# Patient Record
Sex: MALE | Race: WHITE
[De-identification: names, ages, dates, MRNs, and addresses within clinical notes are randomized per-mention and may not be internally consistent; named-entity substitution may affect disease eponyms.]

---

## 2023-09-21 ENCOUNTER — RX ONLY (OUTPATIENT)
Age: 48
Setting detail: RX ONLY
End: 2023-09-21

## 2023-09-21 ENCOUNTER — APPOINTMENT (RX ONLY)
Dept: URBAN - NONMETROPOLITAN AREA CLINIC 6 | Facility: CLINIC | Age: 48
Setting detail: DERMATOLOGY
End: 2023-09-21

## 2023-09-21 DIAGNOSIS — B35.1 TINEA UNGUIUM: ICD-10-CM

## 2023-09-21 DIAGNOSIS — B35.3 TINEA PEDIS: ICD-10-CM

## 2023-09-21 DIAGNOSIS — B35.4 TINEA CORPORIS: ICD-10-CM

## 2023-09-21 PROCEDURE — ? KOH PREP

## 2023-09-21 PROCEDURE — ? ADDITIONAL NOTES

## 2023-09-21 PROCEDURE — ? COUNSELING

## 2023-09-21 PROCEDURE — 99203 OFFICE O/P NEW LOW 30 MIN: CPT

## 2023-09-21 PROCEDURE — ? TREATMENT REGIMEN

## 2023-09-21 RX ORDER — TERBINAFINE HYDROCHLORIDE 250 MG/1
TABLET ORAL
Qty: 14 | Refills: 0 | Status: ERX | COMMUNITY
Start: 2023-09-21

## 2023-09-21 ASSESSMENT — LOCATION DETAILED DESCRIPTION DERM
LOCATION DETAILED: LEFT 2ND TOENAIL
LOCATION DETAILED: RIGHT GREAT TOENAIL
LOCATION DETAILED: LEFT 3RD TOENAIL
LOCATION DETAILED: RIGHT 5TH TOENAIL
LOCATION DETAILED: LEFT GREAT TOENAIL
LOCATION DETAILED: RIGHT BUTTOCK
LOCATION DETAILED: RIGHT 2ND TOENAIL
LOCATION DETAILED: RIGHT 4TH TOENAIL
LOCATION DETAILED: 1ST WEBSPACE LEFT FOOT
LOCATION DETAILED: RIGHT 3RD TOENAIL
LOCATION DETAILED: 1ST WEBSPACE RIGHT FOOT
LOCATION DETAILED: LEFT 4TH TOENAIL
LOCATION DETAILED: LEFT 5TH TOENAIL

## 2023-09-21 ASSESSMENT — LOCATION SIMPLE DESCRIPTION DERM
LOCATION SIMPLE: RIGHT 2ND TOE
LOCATION SIMPLE: LEFT 5TH TOE
LOCATION SIMPLE: LEFT GREAT TOE
LOCATION SIMPLE: RIGHT BUTTOCK
LOCATION SIMPLE: RIGHT 3RD TOE
LOCATION SIMPLE: LEFT 3RD TOE
LOCATION SIMPLE: LEFT 2ND TOE
LOCATION SIMPLE: RIGHT GREAT TOE
LOCATION SIMPLE: LEFT 4TH TOE
LOCATION SIMPLE: LEFT FOOT
LOCATION SIMPLE: RIGHT 5TH TOE
LOCATION SIMPLE: RIGHT FOOT
LOCATION SIMPLE: RIGHT 4TH TOE

## 2023-09-21 ASSESSMENT — LOCATION ZONE DERM
LOCATION ZONE: TOENAIL
LOCATION ZONE: FEET
LOCATION ZONE: TRUNK

## 2023-09-21 NOTE — HPI: RASH
What Type Of Note Output Would You Prefer (Optional)?: Standard Output
How Severe Is Your Rash?: moderate
Is This A New Presentation, Or A Follow-Up?: Rash
Additional History: Has been treating with topical antifungals which seemed to clear it up for a short period of time. PCP prescribed topical steroids which ended up making the rash worse.

## 2023-09-21 NOTE — PROCEDURE: TREATMENT REGIMEN
Initiate Treatment: Terbinafine 250mg QD x 2 weeks
Plan: Will review labs and discuss treatment with PCP before initiating treatment.  Spoke with PCP, Dr. Viera.  Pt's liver function was just mildly elevated, so he approved oral Terbinafine to treat x 2 weeks.  Will have pt d/c his statin while on the oral antifungal.  Dr. Viera stated he would be happy to recheck LFT's after treatment if pt elects.
Discontinue Regimen: Atorvastatin while on Terbinafine, TAC cream
Detail Level: Zone
Otc Regimen: Lamisil cream BID until clear
Plan: Discussed vinegar water soaks, Vicks vapor rub, and Jublia.  Discussed oral antifungal treatment for nails is 12 weeks which concerns him with his elevated LFT.  He will try vinegar soaks and Vicks for now.

## 2023-09-21 NOTE — PROCEDURE: ADDITIONAL NOTES
Render Risk Assessment In Note?: no
Additional Notes: Lamisil cream BID until resolved then 1-2 times per week as maintenance.  Vinegar water soaks.  He may also use antifungal spray or powder in shoes to avoid recontamination.
Detail Level: Detailed